# Patient Record
Sex: MALE | Race: BLACK OR AFRICAN AMERICAN | NOT HISPANIC OR LATINO | Employment: PART TIME | URBAN - METROPOLITAN AREA
[De-identification: names, ages, dates, MRNs, and addresses within clinical notes are randomized per-mention and may not be internally consistent; named-entity substitution may affect disease eponyms.]

---

## 2022-03-09 ENCOUNTER — LAB VISIT (OUTPATIENT)
Dept: LAB | Facility: HOSPITAL | Age: 27
End: 2022-03-09
Attending: FAMILY MEDICINE
Payer: COMMERCIAL

## 2022-03-09 ENCOUNTER — OFFICE VISIT (OUTPATIENT)
Dept: FAMILY MEDICINE | Facility: CLINIC | Age: 27
End: 2022-03-09
Payer: COMMERCIAL

## 2022-03-09 VITALS
HEIGHT: 63 IN | BODY MASS INDEX: 30.62 KG/M2 | SYSTOLIC BLOOD PRESSURE: 128 MMHG | WEIGHT: 172.81 LBS | RESPIRATION RATE: 18 BRPM | DIASTOLIC BLOOD PRESSURE: 88 MMHG | OXYGEN SATURATION: 99 % | HEART RATE: 78 BPM | TEMPERATURE: 99 F

## 2022-03-09 DIAGNOSIS — Z00.00 ROUTINE MEDICAL EXAM: Primary | ICD-10-CM

## 2022-03-09 DIAGNOSIS — Z11.59 NEED FOR HEPATITIS C SCREENING TEST: ICD-10-CM

## 2022-03-09 DIAGNOSIS — Z00.00 ROUTINE MEDICAL EXAM: ICD-10-CM

## 2022-03-09 DIAGNOSIS — L50.9 HIVES: ICD-10-CM

## 2022-03-09 DIAGNOSIS — Z13.6 ENCOUNTER FOR LIPID SCREENING FOR CARDIOVASCULAR DISEASE: ICD-10-CM

## 2022-03-09 DIAGNOSIS — Z13.220 ENCOUNTER FOR LIPID SCREENING FOR CARDIOVASCULAR DISEASE: ICD-10-CM

## 2022-03-09 DIAGNOSIS — Z11.4 ENCOUNTER FOR SCREENING FOR HIV: ICD-10-CM

## 2022-03-09 LAB
ALBUMIN SERPL BCP-MCNC: 3.8 G/DL (ref 3.5–5.2)
ALP SERPL-CCNC: 91 U/L (ref 55–135)
ALT SERPL W/O P-5'-P-CCNC: 56 U/L (ref 10–44)
ANION GAP SERPL CALC-SCNC: 8 MMOL/L (ref 8–16)
AST SERPL-CCNC: 44 U/L (ref 10–40)
BASOPHILS # BLD AUTO: 0.02 K/UL (ref 0–0.2)
BASOPHILS NFR BLD: 0.5 % (ref 0–1.9)
BILIRUB SERPL-MCNC: 0.3 MG/DL (ref 0.1–1)
BUN SERPL-MCNC: 9 MG/DL (ref 6–20)
CALCIUM SERPL-MCNC: 9 MG/DL (ref 8.7–10.5)
CHLORIDE SERPL-SCNC: 105 MMOL/L (ref 95–110)
CHOLEST SERPL-MCNC: 128 MG/DL (ref 120–199)
CHOLEST/HDLC SERPL: 3.8 {RATIO} (ref 2–5)
CO2 SERPL-SCNC: 27 MMOL/L (ref 23–29)
CREAT SERPL-MCNC: 1 MG/DL (ref 0.5–1.4)
DIFFERENTIAL METHOD: ABNORMAL
EOSINOPHIL # BLD AUTO: 0.1 K/UL (ref 0–0.5)
EOSINOPHIL NFR BLD: 3.2 % (ref 0–8)
ERYTHROCYTE [DISTWIDTH] IN BLOOD BY AUTOMATED COUNT: 13 % (ref 11.5–14.5)
EST. GFR  (AFRICAN AMERICAN): >60 ML/MIN/1.73 M^2
EST. GFR  (NON AFRICAN AMERICAN): >60 ML/MIN/1.73 M^2
GLUCOSE SERPL-MCNC: 97 MG/DL (ref 70–110)
HCT VFR BLD AUTO: 44 % (ref 40–54)
HDLC SERPL-MCNC: 34 MG/DL (ref 40–75)
HDLC SERPL: 26.6 % (ref 20–50)
HGB BLD-MCNC: 13.7 G/DL (ref 14–18)
IMM GRANULOCYTES # BLD AUTO: 0.01 K/UL (ref 0–0.04)
IMM GRANULOCYTES NFR BLD AUTO: 0.2 % (ref 0–0.5)
LDLC SERPL CALC-MCNC: 76 MG/DL (ref 63–159)
LYMPHOCYTES # BLD AUTO: 1.7 K/UL (ref 1–4.8)
LYMPHOCYTES NFR BLD: 39.7 % (ref 18–48)
MCH RBC QN AUTO: 27.1 PG (ref 27–31)
MCHC RBC AUTO-ENTMCNC: 31.1 G/DL (ref 32–36)
MCV RBC AUTO: 87 FL (ref 82–98)
MONOCYTES # BLD AUTO: 0.6 K/UL (ref 0.3–1)
MONOCYTES NFR BLD: 13.1 % (ref 4–15)
NEUTROPHILS # BLD AUTO: 1.9 K/UL (ref 1.8–7.7)
NEUTROPHILS NFR BLD: 43.3 % (ref 38–73)
NONHDLC SERPL-MCNC: 94 MG/DL
NRBC BLD-RTO: 0 /100 WBC
PLATELET # BLD AUTO: 293 K/UL (ref 150–450)
PMV BLD AUTO: 8.8 FL (ref 9.2–12.9)
POTASSIUM SERPL-SCNC: 4.2 MMOL/L (ref 3.5–5.1)
PROT SERPL-MCNC: 6.8 G/DL (ref 6–8.4)
RBC # BLD AUTO: 5.05 M/UL (ref 4.6–6.2)
SODIUM SERPL-SCNC: 140 MMOL/L (ref 136–145)
TRIGL SERPL-MCNC: 90 MG/DL (ref 30–150)
TSH SERPL DL<=0.005 MIU/L-ACNC: 1.35 UIU/ML (ref 0.4–4)
WBC # BLD AUTO: 4.36 K/UL (ref 3.9–12.7)

## 2022-03-09 PROCEDURE — 84443 ASSAY THYROID STIM HORMONE: CPT | Performed by: FAMILY MEDICINE

## 2022-03-09 PROCEDURE — 96372 THER/PROPH/DIAG INJ SC/IM: CPT | Mod: S$GLB,,, | Performed by: FAMILY MEDICINE

## 2022-03-09 PROCEDURE — 3079F PR MOST RECENT DIASTOLIC BLOOD PRESSURE 80-89 MM HG: ICD-10-PCS | Mod: CPTII,S$GLB,, | Performed by: FAMILY MEDICINE

## 2022-03-09 PROCEDURE — 85025 COMPLETE CBC W/AUTO DIFF WBC: CPT | Performed by: FAMILY MEDICINE

## 2022-03-09 PROCEDURE — 3008F BODY MASS INDEX DOCD: CPT | Mod: CPTII,S$GLB,, | Performed by: FAMILY MEDICINE

## 2022-03-09 PROCEDURE — 99385 PR PREVENTIVE VISIT,NEW,18-39: ICD-10-PCS | Mod: 25,S$GLB,, | Performed by: FAMILY MEDICINE

## 2022-03-09 PROCEDURE — 3079F DIAST BP 80-89 MM HG: CPT | Mod: CPTII,S$GLB,, | Performed by: FAMILY MEDICINE

## 2022-03-09 PROCEDURE — 99385 PREV VISIT NEW AGE 18-39: CPT | Mod: 25,S$GLB,, | Performed by: FAMILY MEDICINE

## 2022-03-09 PROCEDURE — 80061 LIPID PANEL: CPT | Performed by: FAMILY MEDICINE

## 2022-03-09 PROCEDURE — 96372 PR INJECTION,THERAP/PROPH/DIAG2ST, IM OR SUBCUT: ICD-10-PCS | Mod: S$GLB,,, | Performed by: FAMILY MEDICINE

## 2022-03-09 PROCEDURE — 99999 PR PBB SHADOW E&M-NEW PATIENT-LVL IV: CPT | Mod: PBBFAC,,, | Performed by: FAMILY MEDICINE

## 2022-03-09 PROCEDURE — 1159F PR MEDICATION LIST DOCUMENTED IN MEDICAL RECORD: ICD-10-PCS | Mod: CPTII,S$GLB,, | Performed by: FAMILY MEDICINE

## 2022-03-09 PROCEDURE — 1160F RVW MEDS BY RX/DR IN RCRD: CPT | Mod: CPTII,S$GLB,, | Performed by: FAMILY MEDICINE

## 2022-03-09 PROCEDURE — 1159F MED LIST DOCD IN RCRD: CPT | Mod: CPTII,S$GLB,, | Performed by: FAMILY MEDICINE

## 2022-03-09 PROCEDURE — 87389 HIV-1 AG W/HIV-1&-2 AB AG IA: CPT | Performed by: FAMILY MEDICINE

## 2022-03-09 PROCEDURE — 1160F PR REVIEW ALL MEDS BY PRESCRIBER/CLIN PHARMACIST DOCUMENTED: ICD-10-PCS | Mod: CPTII,S$GLB,, | Performed by: FAMILY MEDICINE

## 2022-03-09 PROCEDURE — 86803 HEPATITIS C AB TEST: CPT | Performed by: FAMILY MEDICINE

## 2022-03-09 PROCEDURE — 80053 COMPREHEN METABOLIC PANEL: CPT | Performed by: FAMILY MEDICINE

## 2022-03-09 PROCEDURE — 3074F SYST BP LT 130 MM HG: CPT | Mod: CPTII,S$GLB,, | Performed by: FAMILY MEDICINE

## 2022-03-09 PROCEDURE — 99999 PR PBB SHADOW E&M-NEW PATIENT-LVL IV: ICD-10-PCS | Mod: PBBFAC,,, | Performed by: FAMILY MEDICINE

## 2022-03-09 PROCEDURE — 3008F PR BODY MASS INDEX (BMI) DOCUMENTED: ICD-10-PCS | Mod: CPTII,S$GLB,, | Performed by: FAMILY MEDICINE

## 2022-03-09 PROCEDURE — 3074F PR MOST RECENT SYSTOLIC BLOOD PRESSURE < 130 MM HG: ICD-10-PCS | Mod: CPTII,S$GLB,, | Performed by: FAMILY MEDICINE

## 2022-03-09 RX ORDER — METHYLPREDNISOLONE 4 MG/1
TABLET ORAL
Qty: 1 EACH | Refills: 0 | Status: SHIPPED | OUTPATIENT
Start: 2022-03-09 | End: 2023-10-26 | Stop reason: ALTCHOICE

## 2022-03-09 NOTE — PROGRESS NOTES
03/09/22 0809   Depression Patient Health Questionnaire (PHQ-2)   Over the last two weeks how often have you been bothered by little interest or pleasure in doing things 0   Over the last two weeks how often have you been bothered by feeling down, depressed or hopeless 0   PHQ-2 Total Score 0

## 2022-03-09 NOTE — PATIENT INSTRUCTIONS
Get over the counter loratadine (get the store brand, they are all the same strength) and take 1 tablet twice a day

## 2022-03-09 NOTE — PROGRESS NOTES
Subjective:       Patient ID: Beck Ceja is a 27 y.o. male.    Chief Complaint: No chief complaint on file.    HPI   27 year old male comes in for annual exam. He reports no chronic medical problems. Only acute concern is he has been having hives for last few days on upper extremities, lower extremities, and torso. Started after starting new bath soap. He has switched back but hives continue. No abdominal pain or shortness of breath. No change in stools    Review of Systems   Constitutional: Negative for unexpected weight change.   HENT: Negative for ear pain and sore throat.    Eyes: Negative for visual disturbance.   Respiratory: Negative for shortness of breath.    Cardiovascular: Negative for chest pain.   Gastrointestinal: Negative for abdominal pain and blood in stool.   Endocrine: Negative for cold intolerance and heat intolerance.   Genitourinary: Negative for dysuria and frequency.   Integumentary:  Positive for rash.   Neurological: Negative for weakness, numbness and headaches.   Hematological: Negative for adenopathy.   Psychiatric/Behavioral: Negative for suicidal ideas.         Objective:      Physical Exam  Vitals reviewed.   Constitutional:       General: He is not in acute distress.  HENT:      Head: Normocephalic and atraumatic.      Right Ear: Ear canal and external ear normal.      Left Ear: Ear canal and external ear normal.      Nose: Nose normal.      Mouth/Throat:      Mouth: Mucous membranes are moist.      Pharynx: No oropharyngeal exudate or posterior oropharyngeal erythema.   Eyes:      Extraocular Movements: Extraocular movements intact.      Conjunctiva/sclera: Conjunctivae normal.      Pupils: Pupils are equal, round, and reactive to light.   Cardiovascular:      Rate and Rhythm: Normal rate and regular rhythm.      Pulses: Normal pulses.      Heart sounds: Normal heart sounds.   Pulmonary:      Effort: Pulmonary effort is normal. No respiratory distress.      Breath sounds: No  wheezing or rales.   Abdominal:      General: Abdomen is flat. Bowel sounds are normal. There is no distension.      Palpations: Abdomen is soft.      Tenderness: There is no abdominal tenderness. There is no guarding.   Musculoskeletal:      Cervical back: Normal range of motion. No rigidity or tenderness.   Lymphadenopathy:      Cervical: No cervical adenopathy.   Skin:     General: Skin is warm.      Capillary Refill: Capillary refill takes less than 2 seconds.      Findings: Rash present. Rash is urticarial.   Neurological:      Mental Status: He is alert and oriented to person, place, and time.      Cranial Nerves: No cranial nerve deficit.      Sensory: No sensory deficit.   Psychiatric:         Mood and Affect: Mood normal.         Behavior: Behavior normal.         Assessment:       Problem List Items Addressed This Visit    None     Visit Diagnoses     Routine medical exam    -  Primary    Relevant Orders    Comprehensive Metabolic Panel    CBC Auto Differential    TSH    Encounter for lipid screening for cardiovascular disease        Relevant Orders    Lipid Panel    Encounter for screening for HIV        Relevant Orders    HIV 1/2 Ag/Ab (4th Gen)    Need for hepatitis C screening test        Relevant Orders    Hepatitis C Antibody    Hives        Relevant Medications    methylPREDNISolone sod suc(PF) injection 125 mg (Start on 3/9/2022  8:45 AM)    methylPREDNISolone (MEDROL DOSEPACK) 4 mg tablet    Other Relevant Orders    Comprehensive Metabolic Panel    CBC Auto Differential    TSH          Plan:       Diagnoses and all orders for this visit:    Routine medical exam  -     Comprehensive Metabolic Panel; Future  -     CBC Auto Differential; Future  -     TSH; Future  Age appropriate recommendations reviewed.  Screening labs ordered.    Encounter for lipid screening for cardiovascular disease  -     Lipid Panel; Future  Screening lipid panel ordered.    Encounter for screening for HIV  -     HIV 1/2  Ag/Ab (4th Gen); Future  Screen for HIV as per USPSTF guidelines    Need for hepatitis C screening test  -     Hepatitis C Antibody; Future  Screen for HCV as per USPSTF guidelines    Hives  -     Comprehensive Metabolic Panel; Future  -     CBC Auto Differential; Future  -     TSH; Future  -     methylPREDNISolone sod suc(PF) injection 125 mg  -     methylPREDNISolone (MEDROL DOSEPACK) 4 mg tablet; use as directed  Patien t given SoluMedrol injection in office.  Continue Medrol pack.  Patient to message me if symptoms persist.

## 2022-03-09 NOTE — PROGRESS NOTES
IM Solu-Medrol 125 mg injection administered as ordered. Patient tolerated well without difficulty.

## 2022-03-11 LAB
HCV AB SERPL QL IA: NEGATIVE
HIV 1+2 AB+HIV1 P24 AG SERPL QL IA: NEGATIVE

## 2022-03-15 DIAGNOSIS — R74.01 ELEVATED TRANSAMINASE LEVEL: Primary | ICD-10-CM

## 2022-03-16 ENCOUNTER — TELEPHONE (OUTPATIENT)
Dept: FAMILY MEDICINE | Facility: CLINIC | Age: 27
End: 2022-03-16
Payer: COMMERCIAL

## 2022-03-16 NOTE — TELEPHONE ENCOUNTER
----- Message from Abraham Hopper Jr., MD sent at 3/15/2022 10:07 PM CDT -----  Please let patient know lab results were good except 2 of his liver numbers were mildly elevated. We should repeat this in 1-2 weeks. Please schedule. Also ask patient how the hives are.

## 2022-03-16 NOTE — TELEPHONE ENCOUNTER
----- Message from Feroz Carter LPN sent at 3/16/2022 10:34 AM CDT -----  Regarding: FW: self [phone]    ----- Message -----  From: Riya Vivar  Sent: 3/16/2022  10:31 AM CDT  To: Ward Rosario Staff  Subject: self [phone]                                .Type:  Patient Returning Call    Who Called:  self     Who Left Message for Patient: Laura Osullivan    Does the patient know what this is regarding? yes    Would the patient rather a call back or a response via [facility]?  Call back     Best Call Back Number: [phone]

## 2022-03-30 ENCOUNTER — LAB VISIT (OUTPATIENT)
Dept: LAB | Facility: HOSPITAL | Age: 27
End: 2022-03-30
Attending: FAMILY MEDICINE
Payer: COMMERCIAL

## 2022-03-30 ENCOUNTER — PATIENT MESSAGE (OUTPATIENT)
Dept: FAMILY MEDICINE | Facility: CLINIC | Age: 27
End: 2022-03-30
Payer: COMMERCIAL

## 2022-03-30 DIAGNOSIS — R74.01 ELEVATED TRANSAMINASE LEVEL: ICD-10-CM

## 2022-03-30 LAB
ALBUMIN SERPL BCP-MCNC: 4 G/DL (ref 3.5–5.2)
ALP SERPL-CCNC: 69 U/L (ref 55–135)
ALT SERPL W/O P-5'-P-CCNC: 32 U/L (ref 10–44)
AST SERPL-CCNC: 21 U/L (ref 10–40)
BILIRUB DIRECT SERPL-MCNC: 0.2 MG/DL (ref 0.1–0.3)
BILIRUB SERPL-MCNC: 0.5 MG/DL (ref 0.1–1)
PROT SERPL-MCNC: 6.9 G/DL (ref 6–8.4)

## 2022-03-30 PROCEDURE — 80076 HEPATIC FUNCTION PANEL: CPT | Performed by: FAMILY MEDICINE

## 2022-03-30 PROCEDURE — 36415 COLL VENOUS BLD VENIPUNCTURE: CPT | Mod: PN | Performed by: FAMILY MEDICINE

## 2022-07-18 NOTE — TELEPHONE ENCOUNTER
----- Message from Laura Osullivan MA sent at 3/16/2022 11:14 AM CDT -----  Regarding: FW: self [phone]  Pt states he changes soap and doing better   ----- Message -----  From: Feroz Carter LPN  Sent: 3/16/2022  10:34 AM CDT  To: Laura Osullivan MA  Subject: FW: self [phone]                              ----- Message -----  From: Riya Vivar  Sent: 3/16/2022  10:31 AM CDT  To: Ward Rosario Staff  Subject: self [phone]                                .Type:  Patient Returning Call    Who Called:  self     Who Left Message for Patient: Laura Osullivan    Does the patient know what this is regarding? yes    Would the patient rather a call back or a response via [facility]?  Call back     Best Call Back Number: [phone]            
noted  
Left LE

## 2023-11-07 ENCOUNTER — HOSPITAL ENCOUNTER (EMERGENCY)
Facility: HOSPITAL | Age: 28
Discharge: HOME | End: 2023-11-07
Attending: EMERGENCY MEDICINE

## 2023-11-07 VITALS
OXYGEN SATURATION: 98 % | HEART RATE: 63 BPM | DIASTOLIC BLOOD PRESSURE: 60 MMHG | TEMPERATURE: 99 F | SYSTOLIC BLOOD PRESSURE: 130 MMHG | WEIGHT: 170 LBS | RESPIRATION RATE: 16 BRPM | BODY MASS INDEX: 30.12 KG/M2 | HEIGHT: 63 IN

## 2023-11-07 DIAGNOSIS — M25.571 ACUTE RIGHT ANKLE PAIN: Primary | ICD-10-CM

## 2023-11-07 DIAGNOSIS — S99.919A ANKLE INJURY: ICD-10-CM

## 2023-11-07 PROCEDURE — 25000003 PHARM REV CODE 250

## 2023-11-07 PROCEDURE — 90471 IMMUNIZATION ADMIN: CPT

## 2023-11-07 PROCEDURE — 99284 EMERGENCY DEPT VISIT MOD MDM: CPT

## 2023-11-07 PROCEDURE — 90715 TDAP VACCINE 7 YRS/> IM: CPT

## 2023-11-07 PROCEDURE — 63600175 PHARM REV CODE 636 W HCPCS

## 2023-11-07 RX ORDER — HYDROCODONE BITARTRATE AND ACETAMINOPHEN 7.5; 325 MG/1; MG/1
1 TABLET ORAL EVERY 6 HOURS PRN
Qty: 11 TABLET | Refills: 0 | OUTPATIENT
Start: 2023-11-07 | End: 2023-11-13

## 2023-11-07 RX ORDER — MUPIROCIN 20 MG/G
1 OINTMENT TOPICAL
Status: COMPLETED | OUTPATIENT
Start: 2023-11-07 | End: 2023-11-07

## 2023-11-07 RX ORDER — ACETAMINOPHEN 500 MG
500 TABLET ORAL EVERY 4 HOURS PRN
Qty: 30 TABLET | Refills: 0 | OUTPATIENT
Start: 2023-11-07 | End: 2023-11-13

## 2023-11-07 RX ORDER — IBUPROFEN 400 MG/1
400 TABLET ORAL EVERY 6 HOURS PRN
Qty: 30 TABLET | Refills: 0 | OUTPATIENT
Start: 2023-11-07 | End: 2023-11-13

## 2023-11-07 RX ORDER — HYDROCODONE BITARTRATE AND ACETAMINOPHEN 5; 325 MG/1; MG/1
1 TABLET ORAL
Status: COMPLETED | OUTPATIENT
Start: 2023-11-07 | End: 2023-11-07

## 2023-11-07 RX ADMIN — TETANUS TOXOID, REDUCED DIPHTHERIA TOXOID AND ACELLULAR PERTUSSIS VACCINE, ADSORBED 0.5 ML: 5; 2.5; 8; 8; 2.5 SUSPENSION INTRAMUSCULAR at 09:11

## 2023-11-07 RX ADMIN — MUPIROCIN 1 TUBE: 20 OINTMENT TOPICAL at 09:11

## 2023-11-07 RX ADMIN — HYDROCODONE BITARTRATE AND ACETAMINOPHEN 1 TABLET: 5; 325 TABLET ORAL at 09:11

## 2023-11-07 NOTE — DISCHARGE INSTRUCTIONS

## 2023-11-07 NOTE — Clinical Note
"Beck"Sonu Ceja was seen and treated in our emergency department on 11/7/2023.  He may return to work on 11/09/2023.  Please allow patient to be light duty.     If you have any questions or concerns, please don't hesitate to call.      Sheila Genao PA-C"

## 2023-11-07 NOTE — Clinical Note
"Beck"Sonu Ceja was seen and treated in our emergency department on 11/7/2023.  He may return to work on 11/08/2023.       If you have any questions or concerns, please don't hesitate to call.      Sheila Genao PA-C"

## 2023-11-07 NOTE — ED TRIAGE NOTES
Pt complaining of pain 10/10 aching to R fight. Pt states while riding on palat kris his foot became stuck in between the slats. Pt did not take any meds but did soak foot in alcohol at night. Skin tear to right outer ankle, swelling +3 to foot, pulse palpable +2, foot hot to touch. Pt AAO x 4.

## 2023-11-07 NOTE — ED PROVIDER NOTES
"Encounter Date: 11/7/2023    SCRIBE #1 NOTE: I, Mervat Mai, am scribing for, and in the presence of,  Ena Genao PA-C. I have scribed the following portions of the note - Other sections scribed: HPI, ROS.       History     Chief Complaint   Patient presents with    Ankle Pain     Pt reports right ankle pain and swelling after "smashing his ankle in an electric kris" at work last night. Pt unable to bear weight on the foot. Obvious deformity noted. Right pedal pulse palpated, cap refill about 3 seconds.       This is a 28 year old male, with no pertinent PMHx, who presents to the ED complaining of Right Ankle Pain and Edema, symptoms onset last night at 11:30 pm. Patient states that he works at a [employer] and that after hitting his ankle on an electric kris machine last night he has had pain and swelling to his right ankle. Patient also states being unsure if his Tetanus immunization is UTD. No other alleviating or exacerbating factors. Patient denies fall, LOC, or other injury. Patient also denies fever, chills, SOB, nausea, emesis, diarrhea, or other associated symptoms. Patient did not attempt treatment/medications pta. This is the extent of the patient's complaints in the ED. NKDA.    The history is provided by the patient. No  was used.     Review of patient's allergies indicates:  No Known Allergies  No past medical history on file.  No past surgical history on file.  No family history on file.  Social History     Tobacco Use    Smoking status: Never    Smokeless tobacco: Never   Substance Use Topics    Alcohol use: No    Drug use: No     Review of Systems   Constitutional:  Negative for chills, fatigue and fever.   HENT:  Negative for ear pain, rhinorrhea and sore throat.    Eyes:  Negative for pain, discharge and visual disturbance.   Respiratory:  Negative for cough, shortness of breath and wheezing.    Cardiovascular:  Negative for chest pain.   Gastrointestinal:  Negative for " abdominal pain, diarrhea, nausea and vomiting.   Genitourinary:  Negative for dysuria and hematuria.   Musculoskeletal:  Positive for arthralgias (right ankle) and joint swelling (right ankle). Negative for back pain and neck pain.   Skin:  Negative for pallor and rash.   Neurological:  Negative for syncope, numbness and headaches.        (-) Fall, LOC, injury, head trauma   Psychiatric/Behavioral:  Negative for hallucinations and suicidal ideas.        Physical Exam     Initial Vitals [11/07/23 0829]   BP Pulse Resp Temp SpO2   134/88 65 18 99.3 °F (37.4 °C) 100 %      MAP       --         Physical Exam    Nursing note and vitals reviewed.  Constitutional: He appears well-developed and well-nourished. He is not diaphoretic.  Non-toxic appearance. No distress.   HENT:   Head: Normocephalic and atraumatic.   Right Ear: Hearing, tympanic membrane, external ear and ear canal normal. Tympanic membrane is not perforated, not erythematous and not bulging.   Left Ear: Hearing, tympanic membrane, external ear and ear canal normal. Tympanic membrane is not perforated, not erythematous and not bulging.   Nose: Nose normal.   Mouth/Throat: Uvula is midline and oropharynx is clear and moist.   Eyes: Conjunctivae and EOM are normal.   Neck: Neck supple.   Normal range of motion.   Full passive range of motion without pain.     Cardiovascular:            Pulses:       Radial pulses are 2+ on the right side and 2+ on the left side.        Dorsalis pedis pulses are 2+ on the right side and 2+ on the left side.   Musculoskeletal:      Cervical back: Full passive range of motion without pain, normal range of motion and neck supple. No rigidity.      Comments: Decreased range of motion of right ankle secondary to pain.  There is surrounding bruising and edema to medial and lateral malleolus to right ankle.  Full range of motion of bilateral toes, left ankle, bilateral knees, bilateral hips.  Strength and sensation intact to bilateral  lower extremities.  There is small abrasion noted to medial malleolus of right ankle.  Bleeding controlled.  No surrounding areas of erythema or cellulitis.      Neurological: He is alert. No cranial nerve deficit.   Neuro intact.  Strength and sensation intact to bilateral upper and lower extremities.   Skin: Skin is warm and dry. No rash noted.         ED Course   Procedures  Labs Reviewed - No data to display       Imaging Results              X-Ray Ankle Complete Right (Final result)  Result time 11/07/23 11:23:11      Final result by Kip Ferguson MD (11/07/23 11:23:11)                   Impression:      RIGHT TIBIA/FIBULA:    No acute fracture deformity or dislocation. Soft tissue swelling about the ankle.    RIGHT ANKLE:    Substantial soft tissue swelling about the ankle, greater medially.    No acute fracture deformity or dislocation.    RIGHT FOOT:    Soft tissue swelling about the ankle and tarsal bones, greater medially.    Mild hallux valgus and metatarsus varus with a small bunion.  Minimal degenerative changes in the toes.    No acute fracture deformity or dislocation.      Electronically signed by: Kip Ferguson  Date:    11/07/2023  Time:    11:23               Narrative:    EXAMINATION:  XR ANKLE COMPLETE 3 VIEW RIGHT; XR FOOT COMPLETE 3 VIEW RIGHT; XR TIBIA FIBULA 2 VIEW RIGHT    CLINICAL HISTORY:  ankle injury; Unspecified injury of unspecified ankle, initial encounter    TECHNIQUE:  AP and lateral radiographs of the right tibia and fibula were performed.    AP, lateral, and oblique radiographs of the right ankle were performed.    AP, lateral, and oblique radiographs of the right foot were performed    COMPARISON:  None    FINDINGS:  RIGHT TIBIA/FIBULA:    No acute fracture deformity or dislocation.  Soft tissue swelling about the ankle.    RIGHT ANKLE:    Soft tissue swelling about the ankle, greatest medially.    Prominent posterior talar process.    No acute fracture deformity.    The  ankle mortise appears congruent, on these non stressed images.    RIGHT FOOT:    Soft tissue swelling about the ankle and tarsal bones, greater medially.    Mild hallux valgus and metatarsus varus with a small bunion.  Minimal degenerative changes in the toes.    No acute fracture deformity or dislocation.                                       X-Ray Foot Complete Right (Final result)  Result time 11/07/23 11:23:11      Final result by Kip Ferguson MD (11/07/23 11:23:11)                   Impression:      RIGHT TIBIA/FIBULA:    No acute fracture deformity or dislocation. Soft tissue swelling about the ankle.    RIGHT ANKLE:    Substantial soft tissue swelling about the ankle, greater medially.    No acute fracture deformity or dislocation.    RIGHT FOOT:    Soft tissue swelling about the ankle and tarsal bones, greater medially.    Mild hallux valgus and metatarsus varus with a small bunion.  Minimal degenerative changes in the toes.    No acute fracture deformity or dislocation.      Electronically signed by: Kip Ferguson  Date:    11/07/2023  Time:    11:23               Narrative:    EXAMINATION:  XR ANKLE COMPLETE 3 VIEW RIGHT; XR FOOT COMPLETE 3 VIEW RIGHT; XR TIBIA FIBULA 2 VIEW RIGHT    CLINICAL HISTORY:  ankle injury; Unspecified injury of unspecified ankle, initial encounter    TECHNIQUE:  AP and lateral radiographs of the right tibia and fibula were performed.    AP, lateral, and oblique radiographs of the right ankle were performed.    AP, lateral, and oblique radiographs of the right foot were performed    COMPARISON:  None    FINDINGS:  RIGHT TIBIA/FIBULA:    No acute fracture deformity or dislocation.  Soft tissue swelling about the ankle.    RIGHT ANKLE:    Soft tissue swelling about the ankle, greatest medially.    Prominent posterior talar process.    No acute fracture deformity.    The ankle mortise appears congruent, on these non stressed images.    RIGHT FOOT:    Soft tissue swelling about  the ankle and tarsal bones, greater medially.    Mild hallux valgus and metatarsus varus with a small bunion.  Minimal degenerative changes in the toes.    No acute fracture deformity or dislocation.                                       X-Ray Tibia Fibula 2 View Right (Final result)  Result time 11/07/23 11:23:11      Final result by Kip Ferguson MD (11/07/23 11:23:11)                   Impression:      RIGHT TIBIA/FIBULA:    No acute fracture deformity or dislocation. Soft tissue swelling about the ankle.    RIGHT ANKLE:    Substantial soft tissue swelling about the ankle, greater medially.    No acute fracture deformity or dislocation.    RIGHT FOOT:    Soft tissue swelling about the ankle and tarsal bones, greater medially.    Mild hallux valgus and metatarsus varus with a small bunion.  Minimal degenerative changes in the toes.    No acute fracture deformity or dislocation.      Electronically signed by: Kip Ferguson  Date:    11/07/2023  Time:    11:23               Narrative:    EXAMINATION:  XR ANKLE COMPLETE 3 VIEW RIGHT; XR FOOT COMPLETE 3 VIEW RIGHT; XR TIBIA FIBULA 2 VIEW RIGHT    CLINICAL HISTORY:  ankle injury; Unspecified injury of unspecified ankle, initial encounter    TECHNIQUE:  AP and lateral radiographs of the right tibia and fibula were performed.    AP, lateral, and oblique radiographs of the right ankle were performed.    AP, lateral, and oblique radiographs of the right foot were performed    COMPARISON:  None    FINDINGS:  RIGHT TIBIA/FIBULA:    No acute fracture deformity or dislocation.  Soft tissue swelling about the ankle.    RIGHT ANKLE:    Soft tissue swelling about the ankle, greatest medially.    Prominent posterior talar process.    No acute fracture deformity.    The ankle mortise appears congruent, on these non stressed images.    RIGHT FOOT:    Soft tissue swelling about the ankle and tarsal bones, greater medially.    Mild hallux valgus and metatarsus varus with a small  bunion.  Minimal degenerative changes in the toes.    No acute fracture deformity or dislocation.                                       Medications   Tdap (BOOSTRIX) vaccine injection 0.5 mL (0.5 mLs Intramuscular Given 11/7/23 0936)   mupirocin 2 % ointment 1 Tube (1 Tube Topical (Top) Given 11/7/23 0928)   HYDROcodone-acetaminophen 5-325 mg per tablet 1 tablet (1 tablet Oral Given 11/7/23 0927)     Medical Decision Making  This is a 28 year old male, with no pertinent PMHx, who presents to the ED complaining of Right Ankle Pain and Edema, symptoms onset last night at 11:30 pm.  On physical exam, patient is well-appearing and in no acute distress.  Nontoxic appearing.  Lungs are clear to auscultation bilaterally.  Abdomen is soft and nontender.  No guarding, rigidity, rebound.  2+ radial pulses bilaterally.  Posterior oropharynx is not erythematous.  No edema or exudate.  Uvula midline.  Bilateral tympanic membrane is normal.  No erythema, bulging, or perforations.  Neuro intact.  Strength and sensation intact bilateral upper and lower extremities.  Decreased range of motion of right ankle secondary to pain.  There is surrounding bruising and edema to medial and lateral malleolus to right ankle.  Full range of motion of bilateral toes, left ankle, bilateral knees, bilateral hips.  Strength and sensation intact to bilateral lower extremities.  There is small abrasion noted to medial malleolus of right ankle.  Bleeding controlled.  No surrounding areas of erythema or cellulitis.  2+ DP pulses bilaterally.  Wound was irrigated well and cleansed.  Bactroban ointment applied.  Tetanus updated.  Norco ordered for pain.  Ice applied.  X-rays revealed no evidence of any acute fractures or dislocations to the ankle, foot, or tib-fib.  There is soft tissue swelling to the right ankle.  Ace wrap applied.  Crutches given.  Ambulatory referral to orthopedist ordered.  Will discharge patient on Tylenol, ibuprofen, and a short  course of pain medicine.  Other orthopedist information was also given to patient on discharge paperwork.  Urged prompt follow-up with orthopedist and PCP for further evaluation.  Encouraged rice therapy upon discharge.    Strict return precautions given. I discussed with the patient/family the diagnosis, treatment plan, indications for return to the emergency department, and for expected follow-up. The patient/family verbalized an understanding. The patient/family is asked if there are any questions or concerns. We discuss the case, until all issues are addressed to the patient/family's satisfaction. Patient/family understands and is agreeable to the plan. Patient is stable and ready for discharge.      Amount and/or Complexity of Data Reviewed  Radiology: ordered.    Risk  OTC drugs.  Prescription drug management.            Scribe Attestation:   Scribe #1: I performed the above scribed service and the documentation accurately describes the services I performed. I attest to the accuracy of the note.                I, Sheila Genao, personally performed the services described in this documentation. All medical record entries made by the scribe were at my direction and in my presence.  I have reviewed the chart and agree that the record reflects my personal performance and is accurate and complete.       Clinical Impression:   Final diagnoses:  [S99.919A] Ankle injury  [M25.571] Acute right ankle pain (Primary)        ED Disposition Condition    Discharge Stable          ED Prescriptions       Medication Sig Dispense Start Date End Date Auth. Provider    acetaminophen (TYLENOL) 500 MG tablet Take 1 tablet (500 mg total) by mouth every 4 (four) hours as needed for Pain or Temperature greater than (100.5 or greater). 30 tablet 11/7/2023 -- Sheila Genao PA-C    ibuprofen (ADVIL,MOTRIN) 400 MG tablet Take 1 tablet (400 mg total) by mouth every 6 (six) hours as needed for Other or Temperature greater than (pain or  temperature of 100.5 or greater). 30 tablet 11/7/2023 -- Sheila Genao PA-C    HYDROcodone-acetaminophen (NORCO) 7.5-325 mg per tablet Take 1 tablet by mouth every 6 (six) hours as needed for Pain. 11 tablet 11/7/2023 -- Sheila Genao PA-C          Follow-up Information       Follow up With Specialties Details Why Contact Info Additional Information    Orthopedics, [facility] - Orthopedic Surgery, Orthopedic Surgery In 2 days for further evaluation [address] [facility] [id number] [phone]       [facility] Orthopedics In 2 days for further evaluation [address] [facility]  [address]  [facility] [address] [phone] Please park in [address] or D and use Trae arevalo. Take Medical Office Bldg. elevators.    Jt Aguillon MD Orthopedic Surgery In 2 days for further evaluation [address]  [address]  [facility] [address]  [phone]       Abraham Hopper Jr., MD Family Medicine In 2 days for further evaluation [address]  [facility] [address]  [phone]       [facility] - Emergency Dept Emergency Medicine In 2 days If symptoms worsen [address] [facility] [place] [address] [phone]              Sheila Genao PA-C  11/07/23 3630

## 2023-11-13 ENCOUNTER — HOSPITAL ENCOUNTER (EMERGENCY)
Facility: HOSPITAL | Age: 28
Discharge: HOME | End: 2023-11-13
Attending: EMERGENCY MEDICINE

## 2023-11-13 VITALS
RESPIRATION RATE: 18 BRPM | SYSTOLIC BLOOD PRESSURE: 136 MMHG | HEART RATE: 64 BPM | TEMPERATURE: 99 F | OXYGEN SATURATION: 100 % | DIASTOLIC BLOOD PRESSURE: 85 MMHG

## 2023-11-13 DIAGNOSIS — S99.911D INJURY OF RIGHT ANKLE, SUBSEQUENT ENCOUNTER: Primary | ICD-10-CM

## 2023-11-13 DIAGNOSIS — S90.511D ABRASION, RIGHT ANKLE, SUBSEQUENT ENCOUNTER: ICD-10-CM

## 2023-11-13 PROCEDURE — 99284 EMERGENCY DEPT VISIT MOD MDM: CPT

## 2023-11-13 PROCEDURE — 25000003 PHARM REV CODE 250: Performed by: NURSE PRACTITIONER

## 2023-11-13 RX ORDER — IBUPROFEN 600 MG/1
600 TABLET ORAL EVERY 6 HOURS PRN
Qty: 20 TABLET | Refills: 0 | Status: SHIPPED | OUTPATIENT
Start: 2023-11-13

## 2023-11-13 RX ORDER — MUPIROCIN 20 MG/G
OINTMENT TOPICAL 3 TIMES DAILY
Qty: 15 G | Refills: 0 | Status: SHIPPED | OUTPATIENT
Start: 2023-11-13

## 2023-11-13 RX ORDER — ACETAMINOPHEN 500 MG
1000 TABLET ORAL EVERY 6 HOURS PRN
Qty: 20 TABLET | Refills: 0 | Status: SHIPPED | OUTPATIENT
Start: 2023-11-13

## 2023-11-13 RX ORDER — CEPHALEXIN 500 MG/1
500 CAPSULE ORAL
Status: COMPLETED | OUTPATIENT
Start: 2023-11-13 | End: 2023-11-13

## 2023-11-13 RX ORDER — CEPHALEXIN 500 MG/1
500 CAPSULE ORAL 4 TIMES DAILY
Qty: 28 CAPSULE | Refills: 0 | Status: SHIPPED | OUTPATIENT
Start: 2023-11-13 | End: 2023-11-20

## 2023-11-13 RX ADMIN — CEPHALEXIN 500 MG: 500 CAPSULE ORAL at 01:11

## 2023-11-13 RX ADMIN — NEOMYCIN AND POLYMYXIN B SULFATES AND BACITRACIN ZINC 1 EACH: 400; 3.5; 5 OINTMENT TOPICAL at 01:11

## 2023-11-13 NOTE — Clinical Note
"Beck Munguiapriscilla Ceja was seen and treated in our emergency department on 11/13/2023.  He may return to work on 11/15/2023.       If you have any questions or concerns, please don't hesitate to call.      Sully Davis NP"

## 2023-11-13 NOTE — DISCHARGE INSTRUCTIONS
Please keep your wound clean and dry.  Wash gently with soap and water and apply antibiotic ointment (bacitracin, neosporin, etc.) over the wound after washing. Please watch for signs of infection including: increased\spreading redness, swelling, pus-like discharge, or a fever greater than 100.4F. If you experience any of these, please contact your Primary Care Doctor or Return to the Emergency Department for a wound check.     Thank you for coming to our Emergency Department today. It is important to remember that some problems or medical conditions are difficult to diagnose and may not be found during your Emergency Department visit.     Be sure to follow up with your primary care doctor and review all labs/imaging/tests that were performed during your ER visit with them. Some labs/tests may be outside of the normal range and require non-emergent follow-up and further investigation to help diagnose/exclude/prevent complications or other potentially serious medical conditions that were not addressed during your ER visit.    If you do not have a primary care doctor, you may contact the one listed on your discharge paperwork or you may also call the [facility] Appointment Desk at [phone] to schedule an appointment and establish care with one. It is important to your health that you have a primary care doctor.    Please take all medications as directed. All medications may potentially have side-effects and it is impossible to predict which medications may give you side-effects or what side-effects (if any) they will give you.. If you feel that you are having a negative effect or side-effect of any medication you should immediately stop taking them and seek medical attention. If you feel that you are having a life-threatening reaction call 911.    Return to the ER with any questions/concerns, new/concerning symptoms, worsening or failure to improve.     Do not drive, swim, climb to height, take a bath,  operate heavy machinery, drink alcohol or take potentially sedating medications, sign any legal documents or make any important decisions for 24 hours if you have received any pain medications, sedatives or mood altering drugs during your ER visit or within 24 hours of taking them if they have been prescribed to you.     You can find additional resources for Dentists, hearing aids, durable medical equipment, low cost pharmacies and other resources at [email or web address]    BELOW THIS LINE ONLY APPLIES IF YOU HAVE A COVID TEST PENDING OR IF YOU HAVE BEEN DIAGNOSED WITH COVID:  Please access [facility] to review the results of your test. Until the results of your COVID test return, you should isolate yourself so as not to potentially spread illness to others.   If your COVID test returns positive, you should isolate yourself so as not to spread illness to others. After five full days, if you are feeling better and you have not had fever for 24 hours, you can return to your typical daily activities, but you must wear a mask around others for an additional 5 days.   If your COVID test returns negative and you are either unvaccinated or more than six months out from your two-dose vaccine and are not yet boosted, you should still quarantine for 5 full days followed by strict mask use for an additional 5 full days.   If your COVID test returns negative and you have received your 2-dose initial vaccine as well as a booster, you should continue strict mask use for 10 full days after the exposure.  For all those exposed, best practice includes a test at day 5 after the exposure. This can be a home test or a test through one of the many testing centers throughout our community.   Masking is always advised to limit the spread of COVID. Cdc.gov is an excellent site to obtain the latest up to date recommendations regarding COVID and COVID testing.     CDC Testing and Quarantine Guidelines for patients with exposure to a  known-positive COVID-19 person:  A close exposure is defined as anyone who has had an exposure (masked or unmasked) to a known COVID -19 positive person within 6 feet of someone for a cumulative total of 15 minutes or more over a 24-hour period.   Vaccinated and/or if you recently had a positive covid test within 90 days do NOT need to quarantine after contact with someone who had COVID-19 unless you develop symptoms.   Fully vaccinated people who have not had a positive test within 90 days, should get tested 3-5 days after their exposure, even if they don't have symptoms and wear a mask indoors in public for 14 days following exposure or until their test result is negative.      Unvaccinated and/or NOT had a positive test within 90 days and meet close exposure  You are required by CDC guidelines to quarantine for at least 5 days from time of exposure followed by 5 days of strict masking. It is recommended, but not required to test after 5 days, unless you develop symptoms, in which case you should test at that time.  If you get tested after 5 days and your test is positive, your 5 day period of isolation starts the day of the positive test.    If your exposure does not meet the above definition, you can return to your normal daily activities to include social distancing, wearing a mask and frequent handwashing.      Here is a link to guidance from the CDC:  https://www.cdc.gov/media/releases/2021/s1227-isolation-quarantine-guidance.html      [place] Dept Of Health Testing Sites:  https://ldh.la.gov/page/3934      [facility] website with testing locations and guidance:  [email or web address]

## 2023-11-13 NOTE — ED PROVIDER NOTES
Encounter Date: 11/13/2023       History     Chief Complaint   Patient presents with    Ankle Pain     Seen last week for right ankle pain. Did not follow up with referred ortho. States pain hasn't resolved so he hasn't returned to work and wants an extension on days off.      CC:  Ankle pain    HPI:  This is a 28-year-old male with no medical problems presenting to the ED for evaluation of persistent right ankle pain.  He was seen in the ED 1 week ago after sustaining an injury at work.  He had x-rays which were negative for fractures or dislocations.  He was placed in an Ace wrap.  He has been unable to follow-up with orthopedics.  He has been applying antibiotic to his wound.  He denies any fever, chills, drainage from the wound.  He was unable to take the Norco prescribed to him because the pharmacy was out of this medication.  Has been taking ibuprofen and Tylenol at home.  He has been elevating the foot at home.  He has been unable to return to work due to discomfort and is requesting a work note.    The history is provided by the patient. No  was used.     Review of patient's allergies indicates:  No Known Allergies  No past medical history on file.  No past surgical history on file.  No family history on file.  Social History     Tobacco Use    Smoking status: Never    Smokeless tobacco: Never   Substance Use Topics    Alcohol use: No    Drug use: No     Review of Systems   Constitutional:  Negative for fever.   HENT:  Negative for sore throat.    Respiratory:  Negative for shortness of breath.    Cardiovascular:  Negative for chest pain.   Gastrointestinal:  Negative for nausea.   Genitourinary:  Negative for dysuria.   Musculoskeletal:  Positive for arthralgias. Negative for back pain.   Skin:  Positive for wound. Negative for rash.   Neurological:  Negative for weakness.   Hematological:  Does not bruise/bleed easily.       Physical Exam     Initial Vitals [11/13/23 1116]   BP Pulse  Resp Temp SpO2   117/73 88 20 98.6 °F (37 °C) 100 %      MAP       --         Physical Exam    Vitals reviewed.  Constitutional: He appears well-developed and well-nourished. He is not diaphoretic.  Non-toxic appearance. He does not have a sickly appearance. He does not appear ill. No distress.   HENT:   Head: Normocephalic and atraumatic.   Right Ear: External ear normal.   Left Ear: External ear normal.   Neck:   Normal range of motion.  Cardiovascular:  Intact distal pulses.           Pulses:       Dorsalis pedis pulses are 2+ on the right side and 2+ on the left side.   Pulmonary/Chest: No respiratory distress.   Musculoskeletal:         General: Normal range of motion.      Cervical back: Normal range of motion.      Right knee: Normal.      Right ankle: Swelling and ecchymosis present.      Right Achilles Tendon: Normal.      Right foot: Swelling present.      Comments: Ecchymosis to right ankle.  Wound noted to medial malleolus of right ankle with mild surrounding erythema. No drainage or discharge.  Trace swelling to right foot.      Neurological: He is alert and oriented to person, place, and time. GCS eye subscore is 4. GCS verbal subscore is 5. GCS motor subscore is 6.   Skin: Skin is warm, dry and intact. No rash noted. No erythema.   Psychiatric: He has a normal mood and affect. Thought content normal.         ED Course   Procedures  Labs Reviewed - No data to display       Imaging Results    None          Medications   neomycin-bacitracnZn-polymyxnB packet 1 each (1 each Topical (Top) Given 11/13/23 1315)   cephALEXin capsule 500 mg (500 mg Oral Given 11/13/23 1315)     Medical Decision Making  28-year-old male presenting to the ED for re-evaluation of right ankle injury.  Requesting note for work.  On exam, patient is pleasant well-appearing.  Vital signs stable and reassuring.  Afebrile.  Full physical exam as above.  There is an abrasion to the right medial malleolus of the right ankle with mild  surrounding erythema concerning for early cellulitis.  There is no fluctuance, drainage, or discharge.  Will treat with topical mupirocin oral cephalexin.  Reports shoes are tight fitting and uncomfortable to wear at work.  I will place him in a walking boot.  I have encouraged follow-up with orthopedics.  I will also place referral for outpatient follow up with Podiatry.  Return precautions were discussed with the patient verbalized understanding.  He is agreeable to the plan care.    Risk  OTC drugs.  Prescription drug management.                               Clinical Impression:   Final diagnoses:  [S99.918Z] Injury of right ankle, subsequent encounter (Primary)  [S90.511D] Abrasion, right ankle, subsequent encounter        ED Disposition Condition    Discharge Stable          ED Prescriptions       Medication Sig Dispense Start Date End Date Auth. Provider    mupirocin (BACTROBAN) 2 % ointment Apply topically 3 (three) times daily. 15 g 11/13/2023 -- Sully Davis NP    cephALEXin (KEFLEX) 500 MG capsule Take 1 capsule (500 mg total) by mouth 4 (four) times daily. for 7 days 28 capsule 11/13/2023 11/20/2023 Sully Davis NP    ibuprofen (ADVIL,MOTRIN) 600 MG tablet Take 1 tablet (600 mg total) by mouth every 6 (six) hours as needed for Pain. 20 tablet 11/13/2023 -- Sully Davis NP    acetaminophen (TYLENOL) 500 MG tablet Take 2 tablets (1,000 mg total) by mouth every 6 (six) hours as needed for Pain. 20 tablet 11/13/2023 -- Sully Davis NP          Follow-up Information       Follow up With Specialties Details Why Contact Abraham Macedo Jr., MD Family Medicine Schedule an appointment as soon as possible for a visit  For follow-up [address] [facility] [address] [phone]      Cari Hernandez DPM Podiatry Schedule an appointment as soon as possible for a visit  For follow-up [address] [place] [address] [phone]      Tita Alvarez DPM Podiatry, Wound Care  Schedule an appointment as soon as possible for a visit  For follow-up [address]  [place] [address]  [phone]      [facility] - Emergency Dept Emergency Medicine Go to  If symptoms worsen [address] [facility] [place] [id number] [phone]             Sully Davis NP  11/13/23 1549

## 2023-12-04 ENCOUNTER — OFFICE VISIT (OUTPATIENT)
Dept: PODIATRY | Facility: CLINIC | Age: 28
End: 2023-12-04

## 2023-12-04 VITALS — WEIGHT: 170 LBS | HEIGHT: 63 IN | BODY MASS INDEX: 30.12 KG/M2

## 2023-12-04 DIAGNOSIS — Z87.828 HISTORY OF SPRAIN OF ANKLE: ICD-10-CM

## 2023-12-04 DIAGNOSIS — M25.571 ACUTE RIGHT ANKLE PAIN: ICD-10-CM

## 2023-12-04 DIAGNOSIS — L97.312 ULCER OF RIGHT ANKLE, WITH FAT LAYER EXPOSED: Primary | ICD-10-CM

## 2023-12-04 PROCEDURE — 99213 OFFICE O/P EST LOW 20 MIN: CPT | Mod: PBBFAC | Performed by: PODIATRIST

## 2023-12-04 PROCEDURE — 99203 PR OFFICE/OUTPT VISIT, NEW, LEVL III, 30-44 MIN: ICD-10-PCS | Mod: S$PBB,,, | Performed by: PODIATRIST

## 2023-12-04 PROCEDURE — 99999 PR PBB SHADOW E&M-EST. PATIENT-LVL III: CPT | Mod: PBBFAC,,, | Performed by: PODIATRIST

## 2023-12-04 PROCEDURE — 99203 OFFICE O/P NEW LOW 30 MIN: CPT | Mod: S$PBB,,, | Performed by: PODIATRIST

## 2023-12-04 PROCEDURE — 99999 PR PBB SHADOW E&M-EST. PATIENT-LVL III: ICD-10-PCS | Mod: PBBFAC,,, | Performed by: PODIATRIST

## 2023-12-04 NOTE — PROGRESS NOTES
"Subjective:       Patient ID: Beck Ceja is a 28 y.o. male.    Chief Complaint: Foot Ulcer (Foot ulcer, pt is wearing tennis shoes and socks, no pain, non diabetic, pt was last seen by Ashley Paulino MA on 10/26/2023)      HPI: Beck Ceja presents to the clinic today, for evaluation and treatment concerning an ulceration/wound/bulla(e) to the medial RLE ankle. Patient's Primary Care Provider is Abraham Hopper Jr., MD. The PMHx. does include  nothing substantial or related . The wound(s) have/has been present for several weeks. Most recent local wound care w/ topical Abx oitnment. States a Hx of ankle sprain a few weeks ago. States prior negative XR. States the initial injury occurred at work, but since he had not been working there for more than 90 days, he did not have private health insurance. When he discussed the injury with management, he told them that the injury did not occur at work (and thus is not Worker's Comp.), in fear of being fired. States mild ankle pains. Did complete PO Abx. WB with normal shoe gear. Job is [employer] ([employer] The injury occurred on or around 11/6/23 around 1030AM.      No results found for: "HGBA1C"    Review of patient's allergies indicates:  No Known Allergies    History reviewed. No pertinent past medical history.    History reviewed. No pertinent family history.    Social History     Socioeconomic History    Marital status: Single   Tobacco Use    Smoking status: Never    Smokeless tobacco: Never   Substance and Sexual Activity    Alcohol use: No    Drug use: No       History reviewed. No pertinent surgical history.    Review of Systems   Constitutional:  Negative for chills, fatigue and fever.   HENT:  Negative for hearing loss.    Eyes:  Negative for photophobia and visual disturbance.   Respiratory:  Negative for cough, chest tightness, shortness of breath and wheezing.    Cardiovascular:  Negative for chest pain and palpitations. " "  Gastrointestinal:  Negative for constipation, diarrhea, nausea and vomiting.   Endocrine: Negative for cold intolerance and heat intolerance.   Genitourinary:  Negative for flank pain.   Musculoskeletal:  Positive for gait problem. Negative for neck pain and neck stiffness.   Skin:  Positive for wound.   Neurological:  Negative for light-headedness and headaches.   Psychiatric/Behavioral:  Negative for sleep disturbance.           Objective:   Ht 5' 3" (1.6 m)   Wt 77.1 kg (170 lb)   BMI 30.11 kg/m²     Physical Exam  LOWER EXTREMITY PHYSICAL EXAMINATION    DERMATOLOGY: Ulceration, medial LLE ankle joint. Some eschar is noted. No infection is noted. No malodor is noted. The area measures 2.25cm in diameter with a depth of 0.80cm. The wound does probe to the medial capsule. There is no bone or tendon noted.     VASCULAR: DP and PT are 2/4. CFT is WNL.      Assessment:     1. Ulcer of right ankle, with fat layer exposed    2. Acute right ankle pain    3. History of sprain of ankle        Plan:     Ulcer of right ankle, with fat layer exposed    Acute right ankle pain    History of sprain of ankle      Thorough discussion is had with the patient today, concerning the diagnosis, its etiology, and the treatment algorithm at present.     XRAYS are reviewed in detail with the patient. All questions and concerns regarding findings and its/their implications are outlined and discussed.    Local infiltrative block proximal to the area of pathology with 1% Lidocaine + Epinephrine; for anesthesia and hemostasis.    The wound was surgically debrided after adequate prep with alcohol and/or betadine paint. Excisional wound debridement was performed using sharp #10/#15 blade/rounded scalpel and tissue nipper, with removal of all non-viable skin and soft tissues; necrotic skin/tissue formation. The woundbase/wound bed was also debrided to encourage bleeding as to promote/stimulate healing. Debridement was excisional and included " epidermal, dermal and subcutaneous tissues. Post debridement measurements are as above. Hemostasis was achieved. Patient tolerated procedure well and without complications. Local woundcare with topical Abx oinment dressings and bandage thereafter.     Start QD to BID flush and packing.    Needs WoundVac for optimum and speedy healing.    Will discuss with Human Resources to establish this as a Worker's Compensation case.            Future Appointments   Date Time Provider Department Center   1/5/2024 10:40 AM Abraham Hopper Jr., MD [facility]

## 2023-12-13 ENCOUNTER — OFFICE VISIT (OUTPATIENT)
Dept: PODIATRY | Facility: CLINIC | Age: 28
End: 2023-12-13

## 2023-12-13 VITALS — HEIGHT: 63 IN | BODY MASS INDEX: 30.12 KG/M2 | WEIGHT: 170 LBS

## 2023-12-13 DIAGNOSIS — L97.312 ULCER OF RIGHT ANKLE, WITH FAT LAYER EXPOSED: Primary | ICD-10-CM

## 2023-12-13 PROCEDURE — 99999 PR PBB SHADOW E&M-EST. PATIENT-LVL III: ICD-10-PCS | Mod: PBBFAC,,, | Performed by: PODIATRIST

## 2023-12-13 PROCEDURE — 87075 CULTR BACTERIA EXCEPT BLOOD: CPT | Performed by: PODIATRIST

## 2023-12-13 PROCEDURE — 11042 DBRDMT SUBQ TIS 1ST 20SQCM/<: CPT | Mod: PBBFAC | Performed by: PODIATRIST

## 2023-12-13 PROCEDURE — 87077 CULTURE AEROBIC IDENTIFY: CPT | Performed by: PODIATRIST

## 2023-12-13 PROCEDURE — 99213 OFFICE O/P EST LOW 20 MIN: CPT | Mod: 25,S$PBB,, | Performed by: PODIATRIST

## 2023-12-13 PROCEDURE — 99213 OFFICE O/P EST LOW 20 MIN: CPT | Mod: PBBFAC,25 | Performed by: PODIATRIST

## 2023-12-13 PROCEDURE — 87070 CULTURE OTHR SPECIMN AEROBIC: CPT | Performed by: PODIATRIST

## 2023-12-13 PROCEDURE — 11042 PR DEBRIDEMENT, SKIN, SUB-Q TISSUE,=<20 SQ CM: ICD-10-PCS | Mod: S$PBB,,, | Performed by: PODIATRIST

## 2023-12-13 PROCEDURE — 99999 PR PBB SHADOW E&M-EST. PATIENT-LVL III: CPT | Mod: PBBFAC,,, | Performed by: PODIATRIST

## 2023-12-13 PROCEDURE — 99213 PR OFFICE/OUTPT VISIT, EST, LEVL III, 20-29 MIN: ICD-10-PCS | Mod: 25,S$PBB,, | Performed by: PODIATRIST

## 2023-12-13 PROCEDURE — 11042 DBRDMT SUBQ TIS 1ST 20SQCM/<: CPT | Mod: S$PBB,,, | Performed by: PODIATRIST

## 2023-12-13 PROCEDURE — 87186 SC STD MICRODIL/AGAR DIL: CPT | Mod: 59 | Performed by: PODIATRIST

## 2023-12-13 PROCEDURE — 87184 SC STD DISK METHOD PER PLATE: CPT | Performed by: PODIATRIST

## 2023-12-13 NOTE — PROGRESS NOTES
"Subjective:       Patient ID: Beck Ceja is a 28 y.o. male.    Chief Complaint: Foot Ulcer ( Ulcer of the right ankle, pt is wearing slippers and socks, nondiabetic, rate pain 6/10, )      HPI: Beck Ceja presents to the clinic today for follow up concerning an ulceration at the RLE medial ankle joint/heel. Local wound care with flush and packing. States no infection. Is also doing BID warm water Epsom salt soaks. WB with sandals/slides.     No results found for: "HGBA1C"    Review of patient's allergies indicates:  No Known Allergies    No past medical history on file.    No family history on file.    Social History     Socioeconomic History    Marital status: Single   Tobacco Use    Smoking status: Never    Smokeless tobacco: Never   Substance and Sexual Activity    Alcohol use: No    Drug use: No       No past surgical history on file.    Review of Systems   Constitutional:  Negative for chills, fatigue and fever.   HENT:  Negative for hearing loss.    Eyes:  Negative for photophobia and visual disturbance.   Respiratory:  Negative for cough, chest tightness, shortness of breath and wheezing.    Cardiovascular:  Negative for chest pain and palpitations.   Gastrointestinal:  Negative for constipation, diarrhea, nausea and vomiting.   Endocrine: Negative for cold intolerance and heat intolerance.   Genitourinary:  Negative for flank pain.   Musculoskeletal:  Positive for gait problem. Negative for neck pain and neck stiffness.   Skin:  Positive for wound.   Neurological:  Negative for light-headedness and headaches.   Psychiatric/Behavioral:  Negative for sleep disturbance.           Objective:   Ht 5' 3" (1.6 m)   Wt 77.1 kg (170 lb)   BMI 30.11 kg/m²     Physical Exam  LOWER EXTREMITY PHYSICAL EXAMINATION  DERMATOLOGY: Ulceration, medial LLE ankle joint. No eschar is noted. No infection is noted. No malodor is noted. The area measures 1.10cm x 1.25cm in diameter with a depth of 0.60cm. Granular " tissues are noted.     VASCULAR: DP and PT are 2/4. CFT is WNL.      Assessment:     1. Ulcer of right ankle, with fat layer exposed        Plan:     Ulcer of right ankle, with fat layer exposed  -     Aerobic culture (Specify Source)  -     CULTURE, ANAEROBE      Thorough discussion is had with the patient today, concerning the diagnosis, its etiology, and the treatment algorithm at present.     The wound was surgically debrided after adequate prep with alcohol and/or betadine paint. Excisional wound debridement was performed using sharp #10/#15 blade/rounded scalpel and tissue nipper, with removal of all non-viable skin and soft tissues; necrotic skin/tissue formation. The woundbase/wound bed was also debrided to encourage bleeding as to promote/stimulate healing. Debridement was excisional and included epidermal, dermal and subcutaneous tissues. Post debridement measurements are as above. Hemostasis was achieved. Patient tolerated procedure well and without complications. Local woundcare with flush and packing and bandage thereafter.     Continue QD to BID flush and packing.          Future Appointments   Date Time Provider Department Center   1/5/2024 10:40 AM Abraham Hopper Jr., MD [facility] [place]

## 2023-12-16 DIAGNOSIS — M25.571 ACUTE RIGHT ANKLE PAIN: Primary | ICD-10-CM

## 2023-12-16 DIAGNOSIS — L97.312 ULCER OF RIGHT ANKLE, WITH FAT LAYER EXPOSED: ICD-10-CM

## 2023-12-16 RX ORDER — SULFAMETHOXAZOLE AND TRIMETHOPRIM 800; 160 MG/1; MG/1
1 TABLET ORAL 2 TIMES DAILY
Qty: 20 TABLET | Refills: 0 | Status: SHIPPED | OUTPATIENT
Start: 2023-12-16 | End: 2023-12-26

## 2023-12-17 LAB
BACTERIA SPEC AEROBE CULT: ABNORMAL
BACTERIA SPEC AEROBE CULT: ABNORMAL

## 2023-12-18 LAB — BACTERIA SPEC ANAEROBE CULT: NORMAL
